# Patient Record
Sex: FEMALE | Race: OTHER | HISPANIC OR LATINO | URBAN - METROPOLITAN AREA
[De-identification: names, ages, dates, MRNs, and addresses within clinical notes are randomized per-mention and may not be internally consistent; named-entity substitution may affect disease eponyms.]

---

## 2024-11-18 ENCOUNTER — EMERGENCY (EMERGENCY)
Facility: HOSPITAL | Age: 79
LOS: 1 days | Discharge: HOME | End: 2024-11-18
Attending: STUDENT IN AN ORGANIZED HEALTH CARE EDUCATION/TRAINING PROGRAM
Payer: COMMERCIAL

## 2024-11-18 VITALS
HEIGHT: 55 IN | HEART RATE: 76 BPM | OXYGEN SATURATION: 94 % | SYSTOLIC BLOOD PRESSURE: 159 MMHG | DIASTOLIC BLOOD PRESSURE: 73 MMHG | RESPIRATION RATE: 18 BRPM | TEMPERATURE: 98 F | WEIGHT: 175.49 LBS

## 2024-11-18 PROCEDURE — 99283 EMERGENCY DEPT VISIT LOW MDM: CPT

## 2024-11-18 PROCEDURE — 99284 EMERGENCY DEPT VISIT MOD MDM: CPT

## 2024-11-18 RX ORDER — LEVOCETIRIZINE DIHYDROCHLORIDE 5 MG/1
1 TABLET ORAL
Qty: 30 | Refills: 0
Start: 2024-11-18 | End: 2024-12-17

## 2024-11-18 RX ORDER — OXYCODONE HYDROCHLORIDE 30 MG/1
1 TABLET ORAL
Qty: 9 | Refills: 0
Start: 2024-11-18 | End: 2024-11-20

## 2024-11-18 RX ORDER — DIPHENHYDRAMINE HCL 2 %
1 GEL (ML) TOPICAL
Qty: 1 | Refills: 1
Start: 2024-11-18 | End: 2024-12-01

## 2024-11-18 NOTE — ED PROVIDER NOTE - CLINICAL SUMMARY MEDICAL DECISION MAKING FREE TEXT BOX
#[id number]: 79-year-old female with a past medical history of hypertension, high cholesterol, hypothyroid, cholecystectomy presents with a painful rash to her right upper quadrant/back that began 8 days ago.  Patient reports that she was given valacyclovir and acyclovir cream by her primary care doctor and began it on November 9.  Patient reports that the pain is improving but the itching is worse.  Denies difficulty breathing, fevers, discharge.     Exam remains consistent with previous diagnosis of shingles.  Patient is asking for pain medication and medication for the itching.  Will send to the pharmacy and have patient follow-up with PCP.  No evidence of any secondary bacterial skin infection at this time.

## 2024-11-18 NOTE — ED ADULT TRIAGE NOTE - PATIENT'S SEXUAL ORIENTATION
Upper GI Endoscopy: Before Your Procedure    What is an upper GI endoscopy? An upper gastrointestinal (or GI) endoscopy is a test that allows your doctor to look at the inside of your esophagus, stomach, and the first part of your small intestine, called the duodenum. The esophagus is the tube that carries food to your stomach. The doctor uses a thin, lighted tube that bends. It is called an endoscope, or scope. The doctor puts the tip of the scope in your mouth and gently moves it down your throat. The scope is a flexible video camera. The doctor looks at a monitor (like a TV set or a computer screen) as he or she moves the scope. A doctor may do this procedure to look for ulcers, tumors, infection, or bleeding. It also can be used to look for signs of acid backing up into your esophagus. This is called gastroesophageal reflux disease, or GERD. The doctor can use the scope to take a sample of tissue for study (a biopsy). The doctor also can use the scope to take out growths or stop bleeding. How do you prepare for the procedure? Procedures can be stressful. This information will help you understand what you can expect. And it will help you safely prepare for your procedure. Preparing for the procedure    Do not eat or drink anything for 6 to 8 hours before the test. An empty stomach helps your doctor see your stomach clearly during the test. It also reduces your chances of vomiting. If you vomit, there is a small risk that the vomit could enter your lungs. (This is called aspiration.) If the test is done in an emergency, a tube may be inserted through your nose or mouth to empty your stomach. Do not take sucralfate (Carafate) or antacids on the day of the test. These medicines can make it hard for your doctor to see your upper GI tract. If your doctor tells you to, stop taking iron supplements 7 to 14 days before the test.     Be sure you have someone to take you home.  Anesthesia and pain medicine will Heterosexual

## 2024-11-18 NOTE — ED PROVIDER NOTE - PHYSICAL EXAMINATION
Right flank - maculopapular vesicular rash with erythema and crusting noted along the T4-6 dermatomes. Some bullae, some ruptured bullae.

## 2024-11-18 NOTE — ED PROVIDER NOTE - PATIENT PORTAL LINK FT
You can access the FollowMyHealth Patient Portal offered by [facility] by registering at the following website: [email or web address]. By joining [facility]’s FollowMyHealth portal, you will also be able to view your health information using other applications (apps) compatible with our system.

## 2024-11-18 NOTE — ED PROVIDER NOTE - OBJECTIVE STATEMENT
#[id number]: 79-year-old female with a past medical history of hypertension, high cholesterol, hypothyroid, cholecystectomy presents with a painful rash to her right upper quadrant/back that began 8 days ago.  Patient reports that she was given valacyclovir and acyclovir cream by her primary care doctor and began it on November 9.  Patient reports that the pain is improving but the itching is worse.  Denies difficulty breathing, fevers, discharge.

## 2024-11-18 NOTE — ED ADULT NURSE NOTE - OBJECTIVE STATEMENT
Patient came in ED accompanied by daughter Romana, Pt complain of RUQ abdomen and Right flank area pain, patient noted with rash on the area. Pt was diagnosed with Shingles by her doctor and was given treatment.

## 2024-11-18 NOTE — ED PROVIDER NOTE - NSFOLLOWUPINSTRUCTIONS_ED_ALL_ED_FT
Mehul un seguimiento con ace médico de atención primaria dentro de 1 semana.     Puede marlene Tylenol (acetaminofén) 1000 mg cada 6 horas según sea necesario para el dolor o la fiebre.     Para el dolor intenso se envió oxicodona a la farmacia.  Tómelo según las indicaciones y sólo según sea necesario.  [place] medicamento puede causar estreñimiento, tómelo con un ablandador de heces y becky dieta viri en fibra.    Los medicamentos para la picazón también se envían a la farmacia.  Tómelo según las indicaciones.    Si experimenta algún síntoma nuevo o que empeora o si está preocupado, siempre puede regresar a la emergencia para becky reevaluación.    Follow up with your primary care doctor within 1 week.     You can take Tylenol (acetaminophen) 1000 mg every 6 hours as needed for pain or fever.     For severe pain oxycodone was sent to the pharmacy.  Take as directed and only as needed.  This medication can be constipating, take with a stool softener and a high-fiber diet.    Medications for the itching also sent to the pharmacy.  Take as directed.    If you experience any new or worsening symptoms or if you are concerned you can always come back to the emergency for a re-evaluation.